# Patient Record
Sex: MALE | URBAN - METROPOLITAN AREA
[De-identification: names, ages, dates, MRNs, and addresses within clinical notes are randomized per-mention and may not be internally consistent; named-entity substitution may affect disease eponyms.]

---

## 2017-07-17 ENCOUNTER — HOSPITAL ENCOUNTER (EMERGENCY)
Facility: CLINIC | Age: 22
Discharge: HOME | End: 2017-07-17
Attending: NURSE PRACTITIONER | Admitting: NURSE PRACTITIONER
Payer: COMMERCIAL

## 2017-07-17 VITALS
BODY MASS INDEX: 23.37 KG/M2 | RESPIRATION RATE: 18 BRPM | HEIGHT: 66 IN | WEIGHT: 145.4 LBS | DIASTOLIC BLOOD PRESSURE: 72 MMHG | TEMPERATURE: 98.5 F | SYSTOLIC BLOOD PRESSURE: 114 MMHG | OXYGEN SATURATION: 100 %

## 2017-07-17 DIAGNOSIS — S61.222A LACERATION OF RIGHT MIDDLE FINGER WITH FOREIGN BODY WITHOUT DAMAGE TO NAIL, INITIAL ENCOUNTER: ICD-10-CM

## 2017-07-17 DIAGNOSIS — M79.644 PAIN OF FINGER OF RIGHT HAND: ICD-10-CM

## 2017-07-17 DIAGNOSIS — Z23 DIPHTHERIA-TETANUS-PERTUSSIS (DTP) VACCINATION: ICD-10-CM

## 2017-07-17 PROCEDURE — 90715 TDAP VACCINE 7 YRS/> IM: CPT | Performed by: NURSE PRACTITIONER

## 2017-07-17 PROCEDURE — 25000128 H RX IP 250 OP 636: Performed by: NURSE PRACTITIONER

## 2017-07-17 PROCEDURE — 12001 RPR S/N/AX/GEN/TRNK 2.5CM/<: CPT

## 2017-07-17 PROCEDURE — 90471 IMMUNIZATION ADMIN: CPT

## 2017-07-17 PROCEDURE — 99283 EMERGENCY DEPT VISIT LOW MDM: CPT | Mod: 25

## 2017-07-17 RX ADMIN — CLOSTRIDIUM TETANI TOXOID ANTIGEN (FORMALDEHYDE INACTIVATED), CORYNEBACTERIUM DIPHTHERIAE TOXOID ANTIGEN (FORMALDEHYDE INACTIVATED), BORDETELLA PERTUSSIS TOXOID ANTIGEN (GLUTARALDEHYDE INACTIVATED), BORDETELLA PERTUSSIS FILAMENTOUS HEMAGGLUTININ ANTIGEN (FORMALDEHYDE INACTIVATED), BORDETELLA PERTUSSIS PERTACTIN ANTIGEN, AND BORDETELLA PERTUSSIS FIMBRIAE 2/3 ANTIGEN 0.5 ML: 5; 2; 2.5; 5; 3; 5 INJECTION, SUSPENSION INTRAMUSCULAR at 17:03

## 2017-07-17 ASSESSMENT — ENCOUNTER SYMPTOMS
WOUND: 1
JOINT SWELLING: 0
MYALGIAS: 0
ARTHRALGIAS: 0

## 2017-07-17 NOTE — ED PROVIDER NOTES
"  History     Chief Complaint:  Laceration    HPI   Miguel Ángel Katz is a 22 year old male who presents to the ED for a laceration to his right middle finger. The patient states that he was working on a car 3 days ago when he cut the knuckle on his right middle finger. He was not seen after the incident. Today the laceration opened up and thus he presents to the ED for further evaluation. The patient denies any pain or swelling of the area along with no other symptoms.    Allergies:  No known drug allergies    Medications:    The patient is not currently taking any prescribed medications.    Past Medical History:    Migraines    Past Surgical History:    Appendectomy    Family History:    History reviewed. No pertinent family history.     Social History:  Smoking status: Former  Alcohol use: No     Review of Systems   Musculoskeletal: Negative for arthralgias, joint swelling and myalgias.   Skin: Positive for wound (right middle finger laceration).   All other systems reviewed and are negative.    Physical Exam   Patient Vitals for the past 24 hrs:   BP Temp Temp src Heart Rate Resp SpO2 Height Weight   07/17/17 1551 114/72 98.5  F (36.9  C) Oral 60 18 100 % 1.676 m (5' 6\") 66 kg (145 lb 6.4 oz)     Physical Exam  Constitutional: Sitting up in bed and non-toxic appearing.  Head: Atraumatic.  Head moves freely with normal range of motion.   Eyes: Conjunctivae pink. EOMs intact.   Neck: Normal range of motion.   Cardiovascular: Intact distal pulses: Radial pulse 2+ on the right, 2+ on the left.   Pulmonary/Chest: No respiratory distress.   Musculoskeletal: Distal capillary refill and sensation intact. Tendon function intact.  Neurological: Oriented to person, place, and time. No focal deficits. GCS 15.  Skin: There is a 1cm laceration noted to the right middle finger.  Skin is warm with no erythema or heat to touch.      Emergency Department Course     Procedures:     Laceration Repair      LACERATION:  A simple and " superficial clean 1 cm laceration.    LOCATION:  Right middle finger.    FUNCTION:  Distally sensation, circulation and motor are intact.    ANESTHESIA:  None.    PREPARATION:  Irrigation and Scrubbing with Normal Saline and Shur Clens.    DEBRIDEMENT:  no debridement.    CLOSURE:  Wound was closed with One Layer.  Skin closed with Steri-strips using interrupted sutures.    Interventions:  1703 - Tdap 0.5 mL IM    Emergency Department Course:  Past medical records, nursing notes, and vitals reviewed.  1644: I performed an exam of the patient and obtained history, as documented above.     1656: I rechecked the patient. Explained findings to patient and Steri-strips were placed.    1704: I rechecked the patient. Findings and plan explained to the Patient and spouse. Patient discharged home with instructions regarding supportive care, medications, and reasons to return. The importance of close follow-up was reviewed.     Impression & Plan      Medical Decision Making:  Miguel Ángel Katz is a 22 year old male who presents for evaluation of a laceration to the right middle finger on the proximal knuckle. The wound was carefully evaluated and explored.  The laceration was closed with Steri-strips as noted above and an Alumafoam splint was placed.  There is no evidence of muscular, tendon, or bony damage with this laceration.  No signs of foreign body.  Possible complications (infection, scarring) were reviewed with the patient.  Follow up with primary care for wound recheck.     Diagnosis:    ICD-10-CM   1. Laceration of right middle finger with foreign body without damage to nail, initial encounter S61.222A   2. Pain of finger of right hand M79.644   3. Diphtheria-tetanus-pertussis (DTP) vaccination Z23       Disposition:  discharged to home      Sabina Jovel  7/17/2017    EMERGENCY DEPARTMENT  I, Sabina Jovel, am serving as a scribe at 4:44 PM on 7/17/2017 to document services personally performed by Naima Chapman CNP  based on my observations and the provider's statements to me.      Naima Chapman, APRN CATHERINE  07/18/17 0014

## 2017-07-17 NOTE — DISCHARGE INSTRUCTIONS
Return for signs or symptoms of infection such as: fever, increased redness, heat to touch, increased pain or pus-like drainage.      Keep splint on until finger is healed. You can remove to shower, then replace.

## 2017-07-17 NOTE — ED AVS SNAPSHOT
Emergency Department    [address] [place] [address]    Phone:  [phone]    Fax:  [phone]                                       Miguel Ángel Katz   MRN: [id number]    Department:   Emergency Department   Date of Visit:  7/17/2017           Patient Information     Date Of Birth          1995        Your diagnoses for this visit were:     Laceration of right middle finger with foreign body without damage to nail, initial encounter     Pain of finger of right hand     Diphtheria-tetanus-pertussis (DTP) vaccination        You were seen by Naima Chapman APRN CNP.      Follow-up Information     Follow up with No Ref-Primary, Physician.    Why:  As needed        Discharge Instructions       Return for signs or symptoms of infection such as: fever, increased redness, heat to touch, increased pain or pus-like drainage.      Keep splint on until finger is healed. You can remove to shower, then replace.         24 Hour Appointment Hotline       To make an appointment at any [facility], call [phone] ([phone]). If you don't have a family doctor or clinic, we will help you find one. [facility] clinics are conveniently located to serve the needs of you and your family.             Review of your medicines      Notice     You have not been prescribed any medications.            Orders Needing Specimen Collection     None      Pending Results     No orders found from 7/15/2017 to 7/18/2017.            Pending Culture Results     No orders found from 7/15/2017 to 7/18/2017.            Pending Results Instructions     If you had any lab results that were not finalized at the time of your Discharge, you can call the ED Lab Result RN at [phone]. You will be contacted by this team for any positive Lab results or changes in treatment. The nurses are available 7 days a week from 10A to 6:30P.  You can leave a message 24 hours per day and they will return your call.         Test Results From Your Hospital Stay               Clinical Quality Measure: Blood Pressure Screening     Your blood pressure was checked while you were in the emergency department today. The last reading we obtained was  BP: 114/72 . Please read the guidelines below about what these numbers mean and what you should do about them.  If your systolic blood pressure (the top number) is less than 120 and your diastolic blood pressure (the bottom number) is less than 80, then your blood pressure is normal. There is nothing more that you need to do about it.  If your systolic blood pressure (the top number) is 120-139 or your diastolic blood pressure (the bottom number) is 80-89, your blood pressure may be higher than it should be. You should have your blood pressure rechecked within a year by a primary care provider.  If your systolic blood pressure (the top number) is 140 or greater or your diastolic blood pressure (the bottom number) is 90 or greater, you may have high blood pressure. High blood pressure is treatable, but if left untreated over time it can put you at risk for heart attack, stroke, or kidney failure. You should have your blood pressure rechecked by a primary care provider within the next 4 weeks.  If your provider in the emergency department today gave you specific instructions to follow-up with your doctor or provider even sooner than that, you should follow that instruction and not wait for up to 4 weeks for your follow-up visit.        Thank you for choosing [facility]       Thank you for choosing [facility] for your care. Our goal is always to provide you with excellent care. Hearing back from our patients is one way we can continue to improve our services. Please take a few minutes to complete the written survey that you may receive in the mail after you visit with us. Thank you!        [employer] Information     [employer] lets you send messages to your doctor, view your test results, renew your  "prescriptions, schedule appointments and more. To sign up, go to [email or web address] . Click on \"Log in\" on the left side of the screen, which will take you to the Welcome page. Then click on \"Sign up Now\" on the right side of the page.     You will be asked to enter the access code listed below, as well as some personal information. Please follow the directions to create your username and password.     Your access code is: [email or web address]  Expires: 10/15/2017  5:09 PM     Your access code will  in 90 days. If you need help or a new code, please call your [facility] clinic or [phone].        Care EveryWhere ID     This is your Care EveryWhere ID. This could be used by other organizations to access your [facility] medical records  [id number]        Equal Access to Services     GALLITO LAINEZ : Jennifer Sevilla, dolores sterling, jaz navarrete, elzbieta weldon . So [facility] [phone].    ATENCIÓN: Si habla español, tiene a szymanski disposición servicios gratuitos de asistencia lingüística. Llame al [phone].    We comply with applicable federal civil rights laws and [place] laws. We do not discriminate on the basis of race, color, national origin, age, disability sex, sexual orientation or gender identity.            After Visit Summary       This is your record. Keep this with you and show to your community pharmacist(s) and doctor(s) at your next visit.                  "

## 2017-07-17 NOTE — LETTER
EMERGENCY DEPARTMENT  [address] [facility] [address]  [phone]    2017    Miguel Ángel Katz  [address]   [facility] [id number] [phone] (home)     : 1995      To Whom it may concern:    Miguel Ángel Katz was seen in our Emergency Department today, 2017.  Please excuse him from work.       Sincerely,        Naima Chapman NP

## 2017-07-17 NOTE — ED AVS SNAPSHOT
Emergency Department    [address] [facility] [address]    Phone:  [phone]    Fax:  [phone]                                       Miguel Ángel Katz   MRN: [id number]    Department:   Emergency Department   Date of Visit:  7/17/2017           After Visit Summary Signature Page     I have received my discharge instructions, and my questions have been answered. I have discussed any challenges I see with this plan with the nurse or doctor.    ..........................................................................................................................................  Patient/Patient Representative Signature      ..........................................................................................................................................  Patient Representative Print Name and Relationship to Patient    ..................................................               ................................................  Date                                            Time    ..........................................................................................................................................  Reviewed by Signature/Title    ...................................................              ..............................................  Date                                                            Time

## 2017-11-13 ENCOUNTER — HOSPITAL ENCOUNTER (EMERGENCY)
Facility: CLINIC | Age: 22
Discharge: HOME | End: 2017-11-13
Attending: PHYSICIAN ASSISTANT | Admitting: PHYSICIAN ASSISTANT
Payer: COMMERCIAL

## 2017-11-13 ENCOUNTER — APPOINTMENT (OUTPATIENT)
Dept: GENERAL RADIOLOGY | Facility: CLINIC | Age: 22
End: 2017-11-13
Attending: PHYSICIAN ASSISTANT
Payer: COMMERCIAL

## 2017-11-13 VITALS
TEMPERATURE: 98.1 F | HEIGHT: 66 IN | OXYGEN SATURATION: 99 % | HEART RATE: 72 BPM | DIASTOLIC BLOOD PRESSURE: 85 MMHG | SYSTOLIC BLOOD PRESSURE: 119 MMHG | WEIGHT: 145 LBS | BODY MASS INDEX: 23.3 KG/M2 | RESPIRATION RATE: 16 BRPM

## 2017-11-13 DIAGNOSIS — S60.511A ABRASION OF RIGHT HAND, INITIAL ENCOUNTER: ICD-10-CM

## 2017-11-13 PROCEDURE — 99283 EMERGENCY DEPT VISIT LOW MDM: CPT

## 2017-11-13 PROCEDURE — 73130 X-RAY EXAM OF HAND: CPT | Mod: RT

## 2017-11-13 ASSESSMENT — ENCOUNTER SYMPTOMS
NUMBNESS: 0
JOINT SWELLING: 1
ARTHRALGIAS: 1
WOUND: 1

## 2017-11-13 NOTE — ED AVS SNAPSHOT
Emergency Department    [address] [facility] [address]    Phone:  [phone]    Fax:  [phone]                                       Miguel Ángel Katz   MRN: [id number]    Department:   Emergency Department   Date of Visit:  11/13/2017           After Visit Summary Signature Page     I have received my discharge instructions, and my questions have been answered. I have discussed any challenges I see with this plan with the nurse or doctor.    ..........................................................................................................................................  Patient/Patient Representative Signature      ..........................................................................................................................................  Patient Representative Print Name and Relationship to Patient    ..................................................               ................................................  Date                                            Time    ..........................................................................................................................................  Reviewed by Signature/Title    ...................................................              ..............................................  Date                                                            Time

## 2017-11-13 NOTE — ED AVS SNAPSHOT
Emergency Department    [address] [facility] [address]    Phone:  [phone]    Fax:  [phone]                                       Miguel Ángel Katz   MRN: [id number]    Department:   Emergency Department   Date of Visit:  11/13/2017           Patient Information     Date Of Birth          1995        Your diagnoses for this visit were:     Abrasion of right hand, initial encounter        You were seen by Sammi Zavala PA-C.      Follow-up Information     Follow up with [facility].    Specialties:  Podiatry, Internal Medicine, Family Medicine    Why:  As needed in 3-4 days if not improved     Contact information:    [address] [address] [phone]        Follow up with  Emergency Department.    Specialty:  EMERGENCY MEDICINE    Why:  If symptoms worsen    Contact information:    [address] [facility] [address] [phone]        Discharge Instructions         Abrasions  Abrasions are skin scrapes. Their treatment depends on how large and deep the abrasion is.  Home care  You may be prescribed an antibiotic cream or ointment to apply to the wound. This helps prevent infection. Follow instructions when using this medicine.  General care    To care for the abrasion, do the following each day for as long as directed by your healthcare provider.    If you were given a bandage, change it once a day. If your bandage sticks to the wound, soak it in warm water until it loosens.    Wash the area with soap and warm water. You may do this in a sink or under a tub faucet or shower. Rinse off the soap. Then pat the area dry with a clean towel.    If antibiotic ointment or cream was prescribed, reapply it to the wound as directed. Cover the wound with a fresh nonstick bandage. If the bandage becomes wet or dirty, change it as soon as possible.    Some antibiotic ointments or cream can cause an allergic reaction or  dermatitis. This may cause redness, itching and or hives. If this occurs, stop using the ointment immediately and wash off any remaining ointment. You may need to take some allergy medicine to relieve symptoms.    You may use acetaminophen or ibuprofen to control pain unless another pain medicine was prescribed. Talk with your healthcare provider before using these medicines if you have chronic liver or kidney disease or ever had a stomach ulcer or GI bleeding. Don t use ibuprofen in children younger than six months old.    Most skin wounds heal within 10 days. But an infection may occur even with treatment. So it s important to watch the wound for signs of infection as listed below.  Follow-up care  Follow up with your healthcare provider, or as advised.  When to seek medical advice  Call your healthcare provider right away if any of these occur:    Fever of 100.4 F (38 C) or higher, or as directed by your healthcare provider    Increasing pain, redness, swelling, or drainage from the wound    Bleeding from the wound that does not stop after a few minutes of steady, firm pressure    Decreased ability to move any body part near the wound  Date Last Reviewed: 3/3/2017    1792-8236 [employer]. [address] [place] [address]. All rights reserved. This information is not intended as a substitute for professional medical care. Always follow your healthcare professional's instructions.          24 Hour Appointment Hotline       To make an appointment at any [facility], call [phone] ([phone]). If you don't have a family doctor or clinic, we will help you find one. [facility] clinics are conveniently located to serve the needs of you and your family.             Review of your medicines      Notice     You have not been prescribed any medications.            Procedures and tests performed during your visit     XR Hand Right G/E 3 Views      Orders Needing Specimen Collection      None      Pending Results     Date and Time Order Name Status Description    11/13/2017 1357 XR Hand Right G/E 3 Views Preliminary             Pending Culture Results     No orders found from 11/11/2017 to 11/14/2017.            Pending Results Instructions     If you had any lab results that were not finalized at the time of your Discharge, you can call the ED Lab Result RN at [phone]. You will be contacted by this team for any positive Lab results or changes in treatment. The nurses are available 7 days a week from 10A to 6:30P.  You can leave a message 24 hours per day and they will return your call.        Test Results From Your Hospital Stay              11/13/2017  2:18 PM      Narrative     RIGHT HAND THREE OR MORE VIEWS   11/13/2017 2:15 PM     HISTORY: Pain and abrasion to little and ring finger MCPs after  injury.    COMPARISON: None.        Impression     IMPRESSION: Bones are normally aligned. No acute fracture.                Clinical Quality Measure: Blood Pressure Screening     Your blood pressure was checked while you were in the emergency department today. The last reading we obtained was  BP: 119/85 . Please read the guidelines below about what these numbers mean and what you should do about them.  If your systolic blood pressure (the top number) is less than 120 and your diastolic blood pressure (the bottom number) is less than 80, then your blood pressure is normal. There is nothing more that you need to do about it.  If your systolic blood pressure (the top number) is 120-139 or your diastolic blood pressure (the bottom number) is 80-89, your blood pressure may be higher than it should be. You should have your blood pressure rechecked within a year by a primary care provider.  If your systolic blood pressure (the top number) is 140 or greater or your diastolic blood pressure (the bottom number) is 90 or greater, you may have high blood pressure. High blood pressure is treatable, but if left  "untreated over time it can put you at risk for heart attack, stroke, or kidney failure. You should have your blood pressure rechecked by a primary care provider within the next 4 weeks.  If your provider in the emergency department today gave you specific instructions to follow-up with your doctor or provider even sooner than that, you should follow that instruction and not wait for up to 4 weeks for your follow-up visit.        Thank you for choosing [facility]       Thank you for choosing [facility] for your care. Our goal is always to provide you with excellent care. Hearing back from our patients is one way we can continue to improve our services. Please take a few minutes to complete the written survey that you may receive in the mail after you visit with us. Thank you!        [employer] Information     [employer] lets you send messages to your doctor, view your test results, renew your prescriptions, schedule appointments and more. To sign up, go to [email or web address] . Click on \"Log in\" on the left side of the screen, which will take you to the Welcome page. Then click on \"Sign up Now\" on the right side of the page.     You will be asked to enter the access code listed below, as well as some personal information. Please follow the directions to create your username and password.     Your access code is: [email or web address]  Expires: 2018  2:30 PM     Your access code will  in 90 days. If you need help or a new code, please call your [facility] clinic or [phone].        Care EveryWhere ID     This is your Care EveryWhere ID. This could be used by other organizations to access your [facility] medical records  [id number]        Equal Access to Services     [facility] : Hadii rachelle Sevilla, wanikkida asher, qaybelzbieta goodwin. So [facility] [phone].    ATENCIÓN: Si habla español, tiene a szymanski disposición servicios gratuitos de asistencia lingüística. " Carrie sue [phone].    We comply with applicable federal civil rights laws and [place] laws. We do not discriminate on the basis of race, color, national origin, age, disability, sex, sexual orientation, or gender identity.            After Visit Summary       This is your record. Keep this with you and show to your community pharmacist(s) and doctor(s) at your next visit.

## 2017-11-13 NOTE — DISCHARGE INSTRUCTIONS
Abrasions  Abrasions are skin scrapes. Their treatment depends on how large and deep the abrasion is.  Home care  You may be prescribed an antibiotic cream or ointment to apply to the wound. This helps prevent infection. Follow instructions when using this medicine.  General care    To care for the abrasion, do the following each day for as long as directed by your healthcare provider.    If you were given a bandage, change it once a day. If your bandage sticks to the wound, soak it in warm water until it loosens.    Wash the area with soap and warm water. You may do this in a sink or under a tub faucet or shower. Rinse off the soap. Then pat the area dry with a clean towel.    If antibiotic ointment or cream was prescribed, reapply it to the wound as directed. Cover the wound with a fresh nonstick bandage. If the bandage becomes wet or dirty, change it as soon as possible.    Some antibiotic ointments or cream can cause an allergic reaction or dermatitis. This may cause redness, itching and or hives. If this occurs, stop using the ointment immediately and wash off any remaining ointment. You may need to take some allergy medicine to relieve symptoms.    You may use acetaminophen or ibuprofen to control pain unless another pain medicine was prescribed. Talk with your healthcare provider before using these medicines if you have chronic liver or kidney disease or ever had a stomach ulcer or GI bleeding. Don t use ibuprofen in children younger than six months old.    Most skin wounds heal within 10 days. But an infection may occur even with treatment. So it s important to watch the wound for signs of infection as listed below.  Follow-up care  Follow up with your healthcare provider, or as advised.  When to seek medical advice  Call your healthcare provider right away if any of these occur:    Fever of 100.4 F (38 C) or higher, or as directed by your healthcare provider    Increasing pain, redness, swelling, or  drainage from the wound    Bleeding from the wound that does not stop after a few minutes of steady, firm pressure    Decreased ability to move any body part near the wound  Date Last Reviewed: 3/3/2017    4672-1822 [employer]. [address] [place] [address], [place] [address]. All rights reserved. This information is not intended as a substitute for professional medical care. Always follow your healthcare professional's instructions.

## 2017-11-13 NOTE — ED PROVIDER NOTES
"  History     Chief Complaint:  Hand Injury      HPI   Miguel Ángel Katz is a 22 year old male who presents to the emergency department for evaluation of right hand injury. The patient states that he was moving a cabinet last night when he sustained an abrasion over the knuckles of his right hand from scraping the side of a wall. Since then, the patient states that he has had pain and swelling to his right hand, especially when trying to grasp objects. This was concerning to him and prompted his ED visit today. He denies any recent numbness or tingling to his right hand or sustaining any other injury to his right hand. Of note, the patient's last tetanus shot was 07/17/2017.     Allergies:  No Known Allergies     Medications:    The patient is currently on no regular medications.    Past Medical History:    Migraines    Past Surgical History:    appendectomy    Family History:    No past pertinent family history.    Social History:  Former Smoker, 0.25 ppd.  Negative for alcohol use.  Marital Status:  Single [1]    Review of Systems   Musculoskeletal: Positive for arthralgias (Right Hand) and joint swelling.   Skin: Positive for wound.   Neurological: Negative for numbness.   All other systems reviewed and are negative.    Physical Exam     Patient Vitals for the past 24 hrs:   BP Temp Temp src Pulse Resp SpO2 Height Weight   11/13/17 1349 119/85 98.1  F (36.7  C) Oral 72 16 99 % 1.676 m (5' 6\") 65.8 kg (145 lb)       Physical Exam  General: Resting comfortably on the gurney.    Resp:  Non-labored breathing. No tachypnea.   CV:  Radial pulses 2+ on the right     Capillary refill less than two seconds right fingers.    MS:  5/5 strength with right , finger flexion and extension at the MCP, PIP and DIP joints.      Normal ROM with right finger flexion and extension at the MCP, PIP and DIP joints.     Right little and ring finger MCP  tenderness with palpation, the remainder of the right upper extremity is non-tender " to palpation. Compartments are soft.   Neuro:  Awake and alert.     Sensation intact to light touch in the median, ulnar and radial nerve distributions as well as distal to the injury.    Skin:  Dorsal aspect of the right hand there are three abrasions, over the little finger MCP it 0.7 cm, ring finger MCP it is 0.6 cm and middle finger MCP it is 0.5 cm. The deepest is over the little finger, but all still superficial with no active bleeding.    Psych: Normal affect. Appropriate interactions.     Emergency Department Course   Imaging:  Radiographic findings were communicated with the patient who voiced understanding of the findings.    XR Hand, Right, G/E 3 views:   Bones are normally aligned. No acute fracture. As per radiology.     Emergency Department Course:  Nursing notes and vitals reviewed. 1353 I performed an exam of the patient as documented above.     The patient was sent for a Hand XR while in the emergency department, findings above.     1418 I rechecked the patient and discussed the results of his workup thus far.     Wounds were cleansed with Normal saline and dressed with bacitracin.     Findings and plan explained to the Patient. Patient discharged home with instructions regarding supportive care, medications, and reasons to return. The importance of close follow-up was reviewed.     Impression & Plan    Medical Decision Making:  Miguel Ángel Katz is a 22 year old male who presented with an abrasion to his right hand.  The wound was carefully evaluated. No indication for suturing at this time. XR is negative for fracture or dislocation. There is no evidence of muscular, tendon, bone, or nerve damage with this injury.  Possible complications, including infection, were reviewed with the patient.  The wounds were cleaned and dressed here. Follow up with primary care as needed for continued symptoms. I discussed the results, plan and any additional questions with the patient. He verbalized understanding and  agreement with the plan.  We discussed reasons to return to the ED including signs of infection (warmth, redness, drainage, fever).     Diagnosis:    ICD-10-CM   1. Abrasion of right hand, initial encounter S60.511A     Disposition:  discharged to home    Liliya GRIFFITH, am serving as a scribe on 11/13/2017 at 1:53 PM to personally document services performed by Sammi Zavala PA-C based on my observations and the provider's statements to me.     Liliya Dorsey  11/13/2017    EMERGENCY DEPARTMENT       Sammi Zavala PA-C  11/13/17 [id number]